# Patient Record
Sex: FEMALE | Race: WHITE | NOT HISPANIC OR LATINO | Employment: OTHER | URBAN - METROPOLITAN AREA
[De-identification: names, ages, dates, MRNs, and addresses within clinical notes are randomized per-mention and may not be internally consistent; named-entity substitution may affect disease eponyms.]

---

## 2017-01-03 ENCOUNTER — TELEPHONE (OUTPATIENT)
Dept: SURGERY | Facility: CLINIC | Age: 52
End: 2017-01-03

## 2017-01-03 NOTE — TELEPHONE ENCOUNTER
----- Message from Deedee Ramos sent at 1/3/2017  1:54 PM CST -----  Contact: pt  Please call pt @ [phone] regarding port, and referral

## 2017-01-04 ENCOUNTER — TELEPHONE (OUTPATIENT)
Dept: SURGERY | Facility: CLINIC | Age: 52
End: 2017-01-04

## 2017-01-04 NOTE — TELEPHONE ENCOUNTER
Spoke to patient to inform her per Dr. Peraza, he needs written documentation from her provider stating the need for a Mediport placement. She was also advised that once she get that information to contact us or have them contact us with records Re: the need of a Mediport placement, she was given the contact information to get in touch with me. She verbalized understanding.